# Patient Record
Sex: MALE
[De-identification: names, ages, dates, MRNs, and addresses within clinical notes are randomized per-mention and may not be internally consistent; named-entity substitution may affect disease eponyms.]

---

## 2023-08-09 ENCOUNTER — APPOINTMENT (OUTPATIENT)
Dept: OTOLARYNGOLOGY | Facility: CLINIC | Age: 43
End: 2023-08-09
Payer: COMMERCIAL

## 2023-08-09 VITALS
HEIGHT: 70 IN | HEART RATE: 79 BPM | DIASTOLIC BLOOD PRESSURE: 74 MMHG | BODY MASS INDEX: 26.48 KG/M2 | OXYGEN SATURATION: 100 % | WEIGHT: 185 LBS | TEMPERATURE: 97.9 F | SYSTOLIC BLOOD PRESSURE: 120 MMHG

## 2023-08-09 DIAGNOSIS — R51.9 HEADACHE, UNSPECIFIED: ICD-10-CM

## 2023-08-09 DIAGNOSIS — J34.89 OTHER SPECIFIED DISORDERS OF NOSE AND NASAL SINUSES: ICD-10-CM

## 2023-08-09 DIAGNOSIS — Z78.9 OTHER SPECIFIED HEALTH STATUS: ICD-10-CM

## 2023-08-09 DIAGNOSIS — J32.9 CHRONIC SINUSITIS, UNSPECIFIED: ICD-10-CM

## 2023-08-09 DIAGNOSIS — Z82.49 FAMILY HISTORY OF ISCHEMIC HEART DISEASE AND OTHER DISEASES OF THE CIRCULATORY SYSTEM: ICD-10-CM

## 2023-08-09 DIAGNOSIS — J32.2 CHRONIC ETHMOIDAL SINUSITIS: ICD-10-CM

## 2023-08-09 DIAGNOSIS — J30.1 ALLERGIC RHINITIS DUE TO POLLEN: ICD-10-CM

## 2023-08-09 DIAGNOSIS — R68.89 OTHER GENERAL SYMPTOMS AND SIGNS: ICD-10-CM

## 2023-08-09 PROBLEM — Z00.00 ENCOUNTER FOR PREVENTIVE HEALTH EXAMINATION: Status: ACTIVE | Noted: 2023-08-09

## 2023-08-09 PROCEDURE — 31231 NASAL ENDOSCOPY DX: CPT

## 2023-08-09 PROCEDURE — 99203 OFFICE O/P NEW LOW 30 MIN: CPT | Mod: 25

## 2023-08-09 RX ORDER — MOMETASONE FUROATE MONOHYDRATE 50 UG/1
SPRAY, METERED NASAL
Refills: 0 | Status: ACTIVE | COMMUNITY

## 2023-08-09 RX ORDER — MONTELUKAST 10 MG/1
10 TABLET, FILM COATED ORAL DAILY
Qty: 30 | Refills: 1 | Status: ACTIVE | COMMUNITY
Start: 2023-08-09 | End: 1900-01-01

## 2023-08-09 NOTE — PROCEDURE
[Anterior rhinoscopy insufficient to account for symptoms] : anterior rhinoscopy insufficient to account for symptoms [Flexible Endoscope] : examined with the flexible endoscope [Normal] : the paranasal sinuses had no abnormalities [Topical Lidocaine] : topical lidocaine [Oxymetazoline HCl] : oxymetazoline HCl [Serial Number: ___] : Serial Number: [unfilled] [Allergic] : allergic signs [FreeTextEntry6] : done to r/o sinusitis, he has had facial pain and nasal drainage requiring antibiotics and steroids and still has facial and forehead pain. Also has bruxism and difficult to distinguish from tmj at this point. Need to use scope to see the omus to r/o sinusitrs. nose appears allergic  Mucosa- allergic Mucous- b nl Polyps- b nl  Inferior turbinate- b nl Middle turbinate- b nl Superior turbinate- b nl Inferior meatus- b nl Middle meatus- b nl Superior meatus- b nl Spheno-ethmoidal recess- b nl

## 2023-08-09 NOTE — ASSESSMENT
[FreeTextEntry1] : 1. facial pain which radiates to the head likely d/t TMJ vs recent sinusitis or allergy per history  -s/p recent course of prednisone and abx -aleve BID x 10 days if pt can tolerate -soft diet -avoid bruxism and chewing gum -followup with dentist for mouth guard  2. AR vs recent sinusitis per history -scope exam revealed allergic rhinitis -avoid allergens -Nasonex once a day -Singulair, confirmed no anxiety/ depression  -asked him to let us know if facial pain persists --> ct will be ordered with follow up in that circumstance  RTC as needed  
less than or equal to 2 seconds

## 2023-08-09 NOTE — PHYSICAL EXAM
[Midline] : trachea located in midline position [Nasal Endoscopy Performed] : nasal endoscopy was performed, see procedure section for findings [de-identified] : b TMJ  [de-identified] : mild AR [de-identified] : mild AR [Normal] : no nystagmus [de-identified] : gait steady

## 2023-08-09 NOTE — REASON FOR VISIT
[Initial Evaluation] : an initial evaluation for [FreeTextEntry2] : facial pain, mid-facial and forehead

## 2023-08-09 NOTE — HISTORY OF PRESENT ILLNESS
[de-identified] : 44 y/o M is presenting with forehead pressure/pain which started 2 weeks ago. He initially had intense l eye pain and felt as if his eye was going "to explode" and had discolored nasal drainage. He took prednisone for 5 days and antibiotics (he does not recall name). He still has forehead pressure and has some improvement with ibuprofen. He uses nasonex spray BID and nasal saline with some improvement. He reports that he gets annual sinus pressure/ pain which radiates to his head every summer in July for the past 4 years. He has no pets at home. He denies h/o inhalant allergies. Nonsmoker. He grinds his teeth in the evenings.